# Patient Record
(demographics unavailable — no encounter records)

---

## 2025-03-14 NOTE — ASSESSMENT
[FreeTextEntry1] : --Plan--stable chronic HIV and Stable chronic elevated lipids  and acute cough and chest congestion x 3 days ( resp panel to be done today)  1) continue Genvoya [phone] oral tablet daily for HIV management 2) reviewed past labs today and new labs ordered including CD4, lipoids, tsh, cbc,bmp and orderd thraot culture, oral G & C and viral panel 3) see in 6 months in person ( he may move to [place] ) 4) external provider notes reviewed

## 2025-03-14 NOTE — HISTORY OF PRESENT ILLNESS
[FreeTextEntry1] : Reason For Visit---stable chronic HIV and Stable chronic elevated lipids and acute cough and chest congestion x 3 days ( resp panel to be done today)  On 10/31/23---changed Rosuvastatin 10 to 20mg daily for increased lipid control. I discussed the plan with patient . He continues to take Genvoya daily, though plan to change due to increased VL Patient reports being followed by a Urologist for chronic hematuria. He underwent a Cystoscopy and other testing and he states there is no clear cut rational for the hematuria. He is to continue to f/u with the Urologist every 6 months. Seen by both cardiology , Dr. Morrison and by pulmonary for the CT scan and was told the nodules were from the TB he had in the past. Advised to quit smoking. Patient denies any new medical occurrences or recent hospitalizations. Very low level virema noted. I will order genosure archive. Pt had J & J Covid vaccine on 4/6/21 Declines G & C tests. na family hx of colon cancer. smokes and drinks...discussed smoking quitting options at length and now only smokes on weekends Just had colonoscopy for 2023 and will go back in 5 years, all fine.  --Plan--stable chronic HIV and Stable chronic elevated lipids  and acute cough and chest congestion x 3 days ( resp panel to be done today)  1) continue Genvoya [phone] oral tablet daily for HIV management 2) reviewed past labs today and new labs ordered including CD4, lipoids, tsh, cbc,bmp and orderd thraot culture, oral G & C and viral panel 3) see in 6 months in person ( he may move to [place] ) 4) external provider notes reviewed    Sexual History: The patient is not sexually active.    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)  Dental disease (525.9) (K08.9)  Elevated cholesterol (272.0) (E78.00)  H/O CD4 count (V15.89) (Z92.89)  Hemorrhoid (455.6) (K64.9)  HIV disease (042) (B20)  Refused influenza vaccine (V64.06) (Z28.21)  Refused pneumococcal vaccine (V64.06) (Z28.21)  Vitamin D insufficiency (268.9) (E55.9)    Past Medical History  History of angular cheilitis (V12.79) (Z87.19)  History of candidiasis of mouth (V12.09) (Z86.19)  History of condyloma acuminatum (V12.09) (Z86.19)  History of tuberculosis (V12.01) (Z86.11)  History of Pap smear of anus with ASCUS (796.71) (R85.610)  History of Visit for dental examination (V72.2) (Z01.20)  History of Visit for eye and vision exam (V72.0) (Z01.00)    Current Meds  Genvoya [phone] MG Oral Tablet; Take one tablet by mouth once daily with food  Rosuvastatin Calcium 10 MG Oral Tablet  Vitamin D3 50 MCG (2000 UT) Oral Tablet; TAKE 1 TABLET BY MOUTH DAILY    Allergies  Penicillins